# Patient Record
Sex: MALE | Race: WHITE | HISPANIC OR LATINO | Employment: FULL TIME | URBAN - METROPOLITAN AREA
[De-identification: names, ages, dates, MRNs, and addresses within clinical notes are randomized per-mention and may not be internally consistent; named-entity substitution may affect disease eponyms.]

---

## 2017-10-10 ENCOUNTER — TREATMENT (OUTPATIENT)
Dept: PHYSICAL THERAPY | Facility: CLINIC | Age: 42
End: 2017-10-10

## 2017-10-10 ENCOUNTER — TRANSCRIBE ORDERS (OUTPATIENT)
Dept: PHYSICAL THERAPY | Facility: CLINIC | Age: 42
End: 2017-10-10

## 2017-10-10 DIAGNOSIS — S93.402A SPRAIN OF LEFT ANKLE, UNSPECIFIED LIGAMENT, INITIAL ENCOUNTER: Primary | ICD-10-CM

## 2017-10-10 DIAGNOSIS — M25.572 ACUTE LEFT ANKLE PAIN: Primary | ICD-10-CM

## 2017-10-10 DIAGNOSIS — S93.402A SPRAIN OF LEFT ANKLE, UNSPECIFIED LIGAMENT, INITIAL ENCOUNTER: ICD-10-CM

## 2017-10-10 PROCEDURE — 97110 THERAPEUTIC EXERCISES: CPT | Performed by: PHYSICAL THERAPIST

## 2017-10-10 PROCEDURE — 97001 PR PHYS THERAPY EVALUATION: CPT | Performed by: PHYSICAL THERAPIST

## 2017-10-10 PROCEDURE — 97530 THERAPEUTIC ACTIVITIES: CPT | Performed by: PHYSICAL THERAPIST

## 2017-10-10 NOTE — PROGRESS NOTES
"Physical Therapy Initial Evaluation and Plan of Care    TIME IN  TIME   Patient: Eric Rivero   : 1975  Diagnosis/ICD-10 Code:  Acute left ankle pain [M25.572]  Referring practitioner: No ref. provider found    Subjective Evaluation    History of Present Illness  Date of onset: 9/15/2017  Mechanism of injury: Patient's wife is present to assist him in English translation.  He does speak mild amount of English.    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  Went to [facility].  Xray revealed \"small\" broken bone (possibly chip/avulsion fracture) but patient and wife are unsure where.  Given (B) axillary crutches (though patient is using 'hurry cane' today due to rain/fear of falling).  Patient is wearing walking boot.    Referred to ortho/Dr. Ness.  She changed boot and wrote \"sit down duty\" work restrictions.  Work unable to accommodate.    Pain increases after walking approx. 10 minutes.  Still with swelling. Patient denies prior significant (L) ankle sprains; states he has turned it several times but without lingering problems. Reports he cannot move (L) toes 3-5.      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, specifically drywall finishing   Precautions and Work Restrictions: has been off work since injury; sit down duty Quality of life: good    Pain  Current pain ratin (took Tramadol prior)  At best pain ratin (with Tramadol)  At worst pain ratin  Location: (L) ankle medial and lateral malleoli, anterior talocrural joint, Achilles and posterior calcaneus  Quality: dull ache, throbbing, pressure and sharp  Relieving factors: medications, relaxation, rest and ice (Tramadol, Meloxicam)  Aggravating factors: standing, stairs, squatting and ambulation (steps are to basement only)    Social Support  Lives with: spouse and young children    Diagnostic Tests  X-ray: abnormal (per patient and wife, small closed fracture noted " (unsure where))    Treatments  Previous treatment: immobilization  Current treatment: physical therapy  Patient Goals  Patient goals for therapy: decreased pain, decreased edema, increased motion, increased strength, independence with ADLs/IADLs and return to work  Patient goal: get rid of pain, get foot/ankle moving better, back to work and normal activities           Objective       Observations   Left Ankle/Foot   Positive for edema.     Additional Observation Details  Moderate amount of generalized (L) ankle/foot swelling noted    Tenderness   Left Ankle/Foot   Tenderness in the Achilles insertion, deltoid ligament, dorsum foot, fibula and proximal Achilles.     Additional Tenderness Details  Patient moderately TTP (L) metatarsals 2 and 3    Active Range of Motion   Left Ankle/Foot   Plantar flexion: 28 degrees with pain  Inversion: 21 degrees with pain  Eversion: 11 degrees with pain    Right Ankle/Foot   Dorsiflexion (ke): 8 degrees   Plantar flexion: 50 degrees   Inversion: 42 degrees   Eversion: 31 degrees     Additional Active Range of Motion Details  (L) ankle DF (ke) lacking 3 degrees from neutral with pain    Patient only minimally able to flex toes (L) foot, digits 1 and 2 moreso than digits 3, 4, and 5.      Strength/Myotome Testing     Left Ankle/Foot   Dorsiflexion: 4- (within available ROM, with pain)  Plantar flexion: 4 (within available ROM)  Inversion: 3+ (within available ROM, with pain)  Eversion: 4- (within available ROM, with pain)    Right Ankle/Foot   Normal strength    Swelling   Left Ankle/Foot   Figure 8 girth: 55.7 cm.  Malleoli girth: 27.8 cm.    Right Ankle/Foot   Figure 8 girth: 52.6 cm.  Malleoli girth: 25.5 cm.         Assessment & Plan     Assessment  Impairments: abnormal gait, abnormal or restricted ROM, activity intolerance, impaired balance, impaired physical strength, lacks appropriate home exercise program, pain with function and weight-bearing intolerance  Other impairment:  "profoundly limited ADLs, functional mobility, and inabiliy to tolerate working  Assessment details: STGs: to be met in 3 weeks  1. Patient will be independent with initial HEP  2. Patient will be minimally tender to palpation over 50% more localized area  3. Patient will report improved s/s 0-3/10  4. Patient will demonstrate (L) ankle AROM WFL all planes, painfree, including movement of all toes  5. Patient will tolerate weightshifting activity 75% of body weight 10 x 10 sec (L) LE  6. Patient will ambulate short community distances with cane and minimal to moderate antalgia    LTGs: to be met in 6 weeks  1. Patient will be independent with progressed strength and balance HEP  2. Patient will report improved s/s 0-1/10  3. Patient will demonstrate (L) ankle strength grossly 5/5 all planes  4. Patient will demonstrate ability to perform full functional squat  5. Patient will negotiate 12 (7+\") steps reciprocally with handrail  6. Patient will tolerate standing/walking for 2 hour periods without increased s/s > 1/10  7. Patient will demonstrate readiness to RTW without restrictions    Prognosis: good  Functional Limitations: carrying objects, lifting, walking, pushing, uncomfortable because of pain, standing and stooping  Plan  Therapy options: will be seen for skilled physical therapy services  Planned modality interventions: cryotherapy, electrical stimulation/Russian stimulation, contrast bath immersion, iontophoresis, thermotherapy (hydrocollator packs) and ultrasound  Planned therapy interventions: balance/weight-bearing training, flexibility, functional ROM exercises, gait training, home exercise program, joint mobilization, manual therapy, neuromuscular re-education, soft tissue mobilization, strengthening, stretching and therapeutic activities  Other planned therapy interventions: simulated work tasks  Frequency: 3x week  Duration in weeks: 6  Treatment plan discussed with: patient  Plan details: Patient has " follow-up appointment with Dr. Ness on 10/17/17        Manual Therapy:         mins  05442;  Therapeutic Exercise:    14     mins  36868;     Neuromuscular Araceli:        mins  32604;    Therapeutic Activity:     28     mins  37069;     Gait Training:           mins  02217;     Ultrasound:          mins  16257;    Electrical Stimulation:         mins  77010 ( );  Dry Needling          mins self-pay    Timed Treatment:   42   mins   Total Treatment:     77   mins    PT SIGNATURE: Dottie Becerra PT, DPT   DATE TREATMENT INITIATED: 10/10/2017    Initial Certification  Certification Period: 1/8/2018  I certify that the therapy services are furnished while this patient is under my care.  The services outlined above are required by this patient, and will be reviewed every 90 days.     PHYSICIAN:       DATE:     Please sign and return via fax to [phone].. Thank you, [facility].

## 2017-10-13 ENCOUNTER — OFFICE VISIT (OUTPATIENT)
Dept: PHYSICAL THERAPY | Facility: CLINIC | Age: 42
End: 2017-10-13

## 2017-10-13 DIAGNOSIS — M25.572 ACUTE LEFT ANKLE PAIN: Primary | ICD-10-CM

## 2017-10-13 PROCEDURE — 97140 MANUAL THERAPY 1/> REGIONS: CPT | Performed by: PHYSICAL THERAPIST

## 2017-10-13 PROCEDURE — 97110 THERAPEUTIC EXERCISES: CPT | Performed by: PHYSICAL THERAPIST

## 2017-10-13 PROCEDURE — 97530 THERAPEUTIC ACTIVITIES: CPT | Performed by: PHYSICAL THERAPIST

## 2017-10-13 PROCEDURE — 97014 ELECTRIC STIMULATION THERAPY: CPT | Performed by: PHYSICAL THERAPIST

## 2017-10-13 NOTE — PROGRESS NOTES
Physical Therapy Daily Progress Note    Time In 1350  Time Out 1445    Eric Rivero reports: left ankle felt a little better after treatment.  States that left ankle moved a little easier but still has pain with certain movements/positions.    Subjective     Objective       Observations   Left Ankle/Foot   Positive for edema.     Additional Observation Details  Ambulates with noted antalgia left LE with walker boot(decreased step/stride length) and one crutch    Tenderness   Left Ankle/Foot   Tenderness in the anterior talofibular ligament and lateral malleolus.      See Exercise, Manual, and Modality Logs for complete treatment.   Added LE bike x 3 min  Exercise rationale, pain free performance/progression to patient and   Home use of ice and elevation left ankle  Instructed in proper use and ambulation with one crutch and walker boot.    Assessment/Plan  Pt presents with noted antalgic gait(decreased step/stride length) left LE while amb in walker boot.  Subjectively, he reports that exercises are helpful but that he still has pain with certain movements/positions. Objectively, he presents with localized swelling along lateral malleoli and forefoot from inactivity/lack of movement.  Should continue to improve with rehab efforts.    Progress strengthening /stabilization /functional activity as symptoms allow.           Manual Therapy:   8    mins  21136;  Therapeutic Exercise:    15    mins  66852;     Neuromuscular Araceli:        mins  81903;    Therapeutic Activity:   10      mins  00331;     Gait Training:           mins  30665;     Ultrasound:       mins  06976;    Electrical Stimulation:    20     mins  95454 ( );      Timed Treatment:   53  mins   Total Treatment:    55   mins    Adam Cruz, [place]    Physical Therapist Assistant [place] [id number]

## 2017-10-16 ENCOUNTER — TREATMENT (OUTPATIENT)
Dept: PHYSICAL THERAPY | Facility: CLINIC | Age: 42
End: 2017-10-16

## 2017-10-16 DIAGNOSIS — M25.572 ACUTE LEFT ANKLE PAIN: Primary | ICD-10-CM

## 2017-10-16 DIAGNOSIS — S93.402D SPRAIN OF LEFT ANKLE, SUBSEQUENT ENCOUNTER: ICD-10-CM

## 2017-10-16 PROCEDURE — 97014 ELECTRIC STIMULATION THERAPY: CPT | Performed by: PHYSICAL THERAPIST

## 2017-10-16 PROCEDURE — 97530 THERAPEUTIC ACTIVITIES: CPT | Performed by: PHYSICAL THERAPIST

## 2017-10-16 PROCEDURE — 97110 THERAPEUTIC EXERCISES: CPT | Performed by: PHYSICAL THERAPIST

## 2017-10-16 NOTE — PROGRESS NOTES
Physical Therapy Progress Note    Time In 1051  Time Out 1148      10/16/2017  Marysol Ness MD    Re: Eric Rivero  ________________________________________________________________    Mr. Eric Rivero, has attended 3/3 PT sessions.  Treatment has consisted of: patient education, therapeutic activity, therapeutic exercise, and modalities.     S: Mr. Eric Rivero states: Patient indicates his ankle is feeling a little better and moving a little better.  He believes he is walking about the same because he is still in the walking boot and using the hurry cane.     Subjective Evaluation    Pain  Current pain ratin  Location: (L) ankle at medial and lateral malleolus and distal gastroc/Achilles           Objective       Tenderness   Left Ankle/Foot   Tenderness in the lateral malleolus and medial malleolus.     Additional Tenderness Details  Mod (+) TTP at distal gastroc muscle    Active Range of Motion   Left Ankle/Foot   Dorsiflexion (ke): 5 degrees   Plantar flexion: 25 degrees with pain  Inversion: 42 degrees with pain  Eversion: 9 degrees     Strength/Myotome Testing     Left Ankle/Foot   Dorsiflexion: 4+  Plantar flexion: 4+  Inversion: 4  Eversion: 4    Ambulation     Ambulation: Level Surfaces   Ambulation with assistive device: independent    Additional Level Surfaces Ambulation Details  Patient ambulates with (L) walking boot and hurry cane (R) UE with significant compensations.     See Exercise, Manual, and Modality Logs for complete treatment.       Assessment & Plan     Assessment  Assessment details: Patient has been compliant and cooperative with PT efforts.  He reports mildly improved (L) ankle s/s.  He exhibits slightly improved (L) ankle dorsiflexion and inversion AROM as well as strength all planes. He is able to actively flex toes 3-5 whereas he was previously unable. His gait remains compensated due to wearing (L) walking boot and using cane.  He responds positively to modality  application such as electrical stimulation and ice.  He is progressing toward PT goals but due to short duration of PT thus far would benefit to continue skilled PT services at this time.  Please advise as to patient's boot wear and wean schedule, per your recommendations.          Awaiting MD orders (to Dr. Ness tomorrow).         Manual Therapy:         mins  94372;  Therapeutic Exercise:    18     mins  09641;     Neuromuscular Araceli:        mins  12378;    Therapeutic Activity:     24     mins  79708;     Gait Training:           mins  79249;     Ultrasound:          mins  58804;    Electrical Stimulation:    15     mins  43749 ( );  Dry Needling          mins self-pay    Timed Treatment:   42   mins   Total Treatment:     57   mins    Dottie Becerra PT, DPT  Physical Therapist  [place] License # [id number]

## 2017-10-18 ENCOUNTER — TREATMENT (OUTPATIENT)
Dept: PHYSICAL THERAPY | Facility: CLINIC | Age: 42
End: 2017-10-18

## 2017-10-18 DIAGNOSIS — M25.572 ACUTE LEFT ANKLE PAIN: Primary | ICD-10-CM

## 2017-10-18 DIAGNOSIS — S93.402D SPRAIN OF LEFT ANKLE, SUBSEQUENT ENCOUNTER: ICD-10-CM

## 2017-10-18 PROCEDURE — 97014 ELECTRIC STIMULATION THERAPY: CPT | Performed by: PHYSICAL THERAPIST

## 2017-10-18 PROCEDURE — 97110 THERAPEUTIC EXERCISES: CPT | Performed by: PHYSICAL THERAPIST

## 2017-10-18 PROCEDURE — 97530 THERAPEUTIC ACTIVITIES: CPT | Performed by: PHYSICAL THERAPIST

## 2017-10-18 NOTE — PROGRESS NOTES
Physical Therapy Daily Progress Note    Time In 1453  Time Out 1552    Eric Rivero reports: Ankle/foot is getting better.  It is tight in distal gastroc/Achilles complex.  Spoke with Shen Isabel PA-C who stated patient does not have an ankle fracture and that he can begin weaning out of the boot with the hope that at his next follow-up (in 2.5 - 3 wks) he is fully weaned from boot.      Subjective     Objective   See Exercise, Manual, and Modality Logs for complete treatment.   *Added rockerboard, BAPS board, and standing weightshifts  *Increased LE bike to 6 min  *Initiated weightshifting activity in standing    Assessment & Plan     Assessment  Assessment details: Patient arrives to PT wearing (L) walking boot and using cane (R) UE.  He is able to initiate weightbearing activity but cannot fully weightbear (L) LE with UE support due to pain.  He is limited in performance of FW/BW rockerboard activity by gastroc and Achilles tightness which limits dorsiflexion ROM arc.  Discussed with patient the goal to be fully weaned out of boot by next MD follow-up as well as method of achieving this by gradually weaning a little more each day.  Patient and wife verbalize understanding.        Progress strengthening /stabilization /functional activity         Manual Therapy:         mins  12651;  Therapeutic Exercise:    21     mins  11657;     Neuromuscular Araceli:        mins  37463;    Therapeutic Activity:     12     mins  26132;     Gait Training:           mins  13286;     Ultrasound:          mins  94421;    Electrical Stimulation:    15     mins  29126 ( );  Dry Needling          mins self-pay    Timed Treatment:   33   mins   Total Treatment:     59   mins    Dottie Becerra PT, DPT  Physical Therapist  [place] License # [id number]

## 2017-10-20 ENCOUNTER — TREATMENT (OUTPATIENT)
Dept: PHYSICAL THERAPY | Facility: CLINIC | Age: 42
End: 2017-10-20

## 2017-10-20 DIAGNOSIS — M25.572 ACUTE LEFT ANKLE PAIN: Primary | ICD-10-CM

## 2017-10-20 DIAGNOSIS — S93.402D SPRAIN OF LEFT ANKLE, SUBSEQUENT ENCOUNTER: ICD-10-CM

## 2017-10-20 PROCEDURE — 97110 THERAPEUTIC EXERCISES: CPT | Performed by: PHYSICAL THERAPIST

## 2017-10-20 PROCEDURE — 97530 THERAPEUTIC ACTIVITIES: CPT | Performed by: PHYSICAL THERAPIST

## 2017-10-20 PROCEDURE — 97014 ELECTRIC STIMULATION THERAPY: CPT | Performed by: PHYSICAL THERAPIST

## 2017-10-20 NOTE — PROGRESS NOTES
"Physical Therapy Daily Progress Note    Time In 1502  Time Out 1553    Eric Rivero reports: \"My ankle still feels a little tight with some things.\"    Subjective     Objective   See Exercise, Manual, and Modality Logs for complete treatment.   *Added 4\" FW and LAT step-ups  *Initiated 4-way resisted ankle vs red theraband    Assessment & Plan     Assessment  Assessment details: Patient is able to initiate forward and lateral step activity on 4\" step without significantly increased s/s.  He is also able to progress to use of red theraband for resisted ankle 4-way AROM with good AROM arcs and fair eccentric control during performance.  Encouraged patient to progressively and gradually wean from boot as able. Patient and wife verbalize understanding.        Progress strengthening /stabilization /functional activity. Progress gait training without walking boot/with regular shoe.         Manual Therapy:         mins  47780;  Therapeutic Exercise:    22     mins  14799;     Neuromuscular Araceli:        mins  82545;    Therapeutic Activity:     14     mins  58706;     Gait Training:           mins  94365;     Ultrasound:          mins  56402;    Electrical Stimulation:    15     mins  65190 ( );  Dry Needling          mins self-pay    Timed Treatment:   36   mins   Total Treatment:     51   mins    Dottie Becerra PT, DPT  Physical Therapist  [place] License # [id number]    "

## 2017-10-23 ENCOUNTER — TREATMENT (OUTPATIENT)
Dept: PHYSICAL THERAPY | Facility: CLINIC | Age: 42
End: 2017-10-23

## 2017-10-23 DIAGNOSIS — S93.402D SPRAIN OF LEFT ANKLE, SUBSEQUENT ENCOUNTER: ICD-10-CM

## 2017-10-23 DIAGNOSIS — M25.572 ACUTE LEFT ANKLE PAIN: Primary | ICD-10-CM

## 2017-10-23 PROCEDURE — 97530 THERAPEUTIC ACTIVITIES: CPT | Performed by: PHYSICAL THERAPIST

## 2017-10-23 PROCEDURE — 97110 THERAPEUTIC EXERCISES: CPT | Performed by: PHYSICAL THERAPIST

## 2017-10-23 PROCEDURE — 97014 ELECTRIC STIMULATION THERAPY: CPT | Performed by: PHYSICAL THERAPIST

## 2017-10-23 NOTE — PROGRESS NOTES
"Physical Therapy Daily Progress Note    Time In 1327  Time Out 1424    Eric Rivero reports: \"I'm not using the [walking boot] much anymore.  I'm walking pretty good without it.  I have a little pain in [Achiles, distal gastroc, and medial and lateral malleolus].  It's getting better.\"    Subjective     Objective   See Exercise, Manual, and Modality Logs for complete treatment.   *Increased LE bike time  *Initiated ambulation on treadmill with regular shoe    Assessment & Plan     Assessment  Assessment details: Patient arrives to PT this date wearing closed-toed sandals on both feet without boot. He ambulates on level surface and on treadmill with minimal antalgia (L) LE at slow speed with cueing given to facilitate more equal stance time as well as heel to toe gait pattern.  He reports increased s/s (L) distal gastroc/Achilles and medial and lateral malleoli during ambulation.  Encouraged patient to begin taking frequent, short walks at home, inside, and in the community (i.e. In stores) to progress tolerance to ambulation and facilitate improved weightbearing for functional mobility.        Progress strengthening /stabilization /functional activity - progress weightbearing activities and consider addition of SLS.  Reassess (L) ankle AROM and strength.         Manual Therapy:         mins  55630;  Therapeutic Exercise:    25     mins  19677;     Neuromuscular Araceli:        mins  54609;    Therapeutic Activity:     14     mins  77814;     Gait Training:           mins  79066;     Ultrasound:          mins  48997;    Electrical Stimulation:    15     mins  46618 ( );  Dry Needling          mins self-pay    Timed Treatment:   39   mins   Total Treatment:     57   mins    Dottie Becerra PT, DPT  Physical Therapist  [place] License # [id number]    "

## 2017-10-25 ENCOUNTER — TREATMENT (OUTPATIENT)
Dept: PHYSICAL THERAPY | Facility: CLINIC | Age: 42
End: 2017-10-25

## 2017-10-25 DIAGNOSIS — S93.402D SPRAIN OF LEFT ANKLE, SUBSEQUENT ENCOUNTER: Primary | ICD-10-CM

## 2017-10-25 DIAGNOSIS — M25.572 ACUTE LEFT ANKLE PAIN: ICD-10-CM

## 2017-10-25 PROCEDURE — 97110 THERAPEUTIC EXERCISES: CPT | Performed by: PHYSICAL THERAPIST

## 2017-10-25 PROCEDURE — 97530 THERAPEUTIC ACTIVITIES: CPT | Performed by: PHYSICAL THERAPIST

## 2017-10-25 PROCEDURE — 97014 ELECTRIC STIMULATION THERAPY: CPT | Performed by: PHYSICAL THERAPIST

## 2017-10-25 NOTE — PROGRESS NOTES
"Physical Therapy Daily Progress Note    Time In 1325  Time Out 1433    Eric Rivero reports: Patient indicates he has pain on both sides of (L) ankle when he puts full weight onto (L) LE. He also reports he is not wearing the boot at all anymore and did not walk with the cane hardly at all yesterday.  He reports pain with descending steps.    Subjective     Objective       Active Range of Motion   Left Ankle/Foot   Dorsiflexion (ke): 3 degrees with pain  Plantar flexion: 42 degrees with pain  Inversion: 44 degrees with pain  Eversion: 12 degrees with pain    Additional Active Range of Motion Details  Pain with all (L) ankle AROM is located at anterior talocrural joint    Swelling   Left Ankle/Foot   Figure 8 girth: 55.2 cm.     See Exercise, Manual, and Modality Logs for complete treatment.   *Increased LE bike time  *Increased step height to 6\"  *Added soleus stretch    Assessment & Plan     Assessment  Assessment details: Patient demonstrates improved (L) ankle AROM all planes except dorsiflexion, and slightly improved (L) ankle girth (as assessed via figure 8 technique) but it remains significantly more swollen than (R) ankle. He ambulates with moderate to at times significant antalgia and compensation (L) LE despite cueing for more equal weightshift and stance time.  Patient expresses concern at the persistent swelling of (L) ankle.  He reports majority of pain c/o are at (L) anterior ankle, medial and lateral malleoli, and distal calf and Achilles.        Progress strengthening /stabilization /functional activity with emphasis on weightbearing and functional mobility.         Manual Therapy:         mins  02073;  Therapeutic Exercise:    26     mins  11485;     Neuromuscular Araceli:        mins  48740;    Therapeutic Activity:     16     mins  08310;     Gait Training:           mins  28463;     Ultrasound:          mins  01037;    Electrical Stimulation:    15     mins  47625 ( );  Dry Needling        "   mins self-pay    Timed Treatment:   42   mins   Total Treatment:     68   mins    Dottie Becerra PT, DPT  Physical Therapist  [place] License # [id number]

## 2017-10-27 ENCOUNTER — OFFICE VISIT (OUTPATIENT)
Dept: PHYSICAL THERAPY | Facility: CLINIC | Age: 42
End: 2017-10-27

## 2017-10-27 DIAGNOSIS — M25.572 ACUTE LEFT ANKLE PAIN: ICD-10-CM

## 2017-10-27 DIAGNOSIS — S93.402D SPRAIN OF LEFT ANKLE, SUBSEQUENT ENCOUNTER: Primary | ICD-10-CM

## 2017-10-27 PROCEDURE — 97110 THERAPEUTIC EXERCISES: CPT | Performed by: PHYSICAL THERAPIST

## 2017-10-27 PROCEDURE — 97014 ELECTRIC STIMULATION THERAPY: CPT | Performed by: PHYSICAL THERAPIST

## 2017-10-27 PROCEDURE — 97530 THERAPEUTIC ACTIVITIES: CPT | Performed by: PHYSICAL THERAPIST

## 2017-10-27 NOTE — PROGRESS NOTES
"Physical Therapy Daily Progress Note    Time In 1330  Time Out 1500    Eric Rivero reports: still pain in left ankle \"across the bones\".  States that he still has difficulty wearing tennis shoes/work boot due to swelling and the tightness of the boot.  Notes discomfort with full weight bearing through right foot.    Subjective     Objective       Ambulation     Observational Gait   Decreased left stance time and left step length.     Additional Observational Gait Details  Presents wearing low cut dress shoe vs tennis shoe secondary to reported tightness.     See Exercise, Manual, and Modality Logs for complete treatment.   Increased reps with HEP regimen.        Assessment/Plan  Demonstrates improved exercise capability in regards to HEP by being able to increase reps without increased noted/reported symptoms/swelling.  Mild edema present left lateral malleolus.  Ambulates with decreased step and stride length and decreased unilateral left LE stance.    Progress strengthening /stabilization /functional activity to include standing Baps and Rockerboard activities.           Manual Therapy:        mins  64025;  Therapeutic Exercise:   35      mins  96721;     Neuromuscular Araceli:        mins  21331;    Therapeutic Activity:    18    mins  17069;     Gait Training:           mins  59191;     Ultrasound:         mins  70558;    Electrical Stimulation:    20    mins  63282 ( );      Timed Treatment:   73  mins   Total Treatment:     90   mins    Adam Cruz PTA    Physical Therapist Assistant [place] [address]    "

## 2017-10-30 ENCOUNTER — OFFICE VISIT (OUTPATIENT)
Dept: PHYSICAL THERAPY | Facility: CLINIC | Age: 42
End: 2017-10-30

## 2017-10-30 DIAGNOSIS — S93.402D SPRAIN OF LEFT ANKLE, SUBSEQUENT ENCOUNTER: Primary | ICD-10-CM

## 2017-10-30 DIAGNOSIS — M25.572 ACUTE LEFT ANKLE PAIN: ICD-10-CM

## 2017-10-30 PROCEDURE — 97530 THERAPEUTIC ACTIVITIES: CPT | Performed by: PHYSICAL THERAPIST

## 2017-10-30 PROCEDURE — 97110 THERAPEUTIC EXERCISES: CPT | Performed by: PHYSICAL THERAPIST

## 2017-10-30 PROCEDURE — 97014 ELECTRIC STIMULATION THERAPY: CPT | Performed by: PHYSICAL THERAPIST

## 2017-10-30 NOTE — PROGRESS NOTES
"Physical Therapy Daily Progress Note    Time In 1050  Time Out 1215    Eric Rivero reports: left ankle  along \"my bones\"(malleoli).  States that his left ankle still hurts him to put all weight on it(standing on one leg) or with certain movements or positions(shifting weight to outside of foot0.    Subjective     Objective       Tenderness   Left Ankle/Foot   Tenderness in the anterior talofibular ligament and calcaneofibular ligament.     Additional Tenderness Details  With moderate/deep pressure    Swelling     Right Ankle/Foot   Figure 8: 55.2 (cm; 55.2 cm) cm  Malleoli: 27 (cm) cm    Ambulation     Observational Gait   Gait: antalgic   Decreased walking speed, stride length and left step length.     Additional Observational Gait Details  Noted ambulates with mild antalgia with left foot ER and decreased step/stride length but in clinic on treadmill and when exiting clinic walking to car.     See Exercise, Manual, and Modality Logs for complete treatment.   Increased resistance with LE bike to Lv4  Changed Baps Lv2 to standing vs. Sitting and seated rockerboard to standing.      Assessment/Plan  Compliant/cooperative with rehab efforts to left ankle.  Subjective complaints persists of lateral malleoli discomfort with unilateral stance and lateral weight shift activities.  Objectively, he presents with noted left lower extremity antalgia with treadmill walking and ambulating to car. Tenderness exhibited along calcaneofibular ligament as well as ATFL left ankle.  Able to progress therapeutic exercises to include more weightbearing without increased symptoms only mild increase in discomfort.  Should continue to improve with rehab efforts and activity progression.    Progress strengthening /stabilization /functional activity as symptoms allow.  Discussed with patient to bring/wear tennis shoes next session to perform exercise/activity.           Manual Therapy:         mins  58380;  Therapeutic " Exercise:   35     mins  80245;     Neuromuscular Araceli:       mins  24712;    Therapeutic Activity:    15      mins  27064;     Gait Training:           mins  55523;     Ultrasound:          mins  80208;    Electrical Stimulation:    20     mins  96595 ( );      Timed Treatment:  70  mins   Total Treatment:     85   mins    Adam Cruz PTA    Physical Therapist Assistant [place] [address]

## 2017-11-01 ENCOUNTER — TREATMENT (OUTPATIENT)
Dept: PHYSICAL THERAPY | Facility: CLINIC | Age: 42
End: 2017-11-01

## 2017-11-01 DIAGNOSIS — S93.402D SPRAIN OF LEFT ANKLE, SUBSEQUENT ENCOUNTER: Primary | ICD-10-CM

## 2017-11-01 DIAGNOSIS — M25.572 ACUTE LEFT ANKLE PAIN: ICD-10-CM

## 2017-11-01 PROCEDURE — 97530 THERAPEUTIC ACTIVITIES: CPT | Performed by: PHYSICAL THERAPIST

## 2017-11-01 PROCEDURE — 97110 THERAPEUTIC EXERCISES: CPT | Performed by: PHYSICAL THERAPIST

## 2017-11-01 PROCEDURE — 97014 ELECTRIC STIMULATION THERAPY: CPT | Performed by: PHYSICAL THERAPIST

## 2017-11-01 NOTE — PROGRESS NOTES
Physical Therapy Daily Progress Note    Time In 1352  Time Out 1510    Eric Rivero reports: Walking a little better, still have pain in the [talocrural joint] when putting full weight on (L) LE and bending forward at the ankle [dorsiflexion with weightbearing].  Don't know how my ankle will do with all the standing, walking, and climbing at work for 10-12 hours.    Subjective     Objective   See Exercise, Manual, and Modality Logs for complete treatment.   *Progressed 4-way resisted ankle to green band  *Added mini squats    Assessment & Plan     Assessment  Assessment details: Patient is able to initiate partial squat without significantly increased s/s but requires mod/max verbal cueing of PT as well as demo for proper technique with fair return.  Increased apparent effort and mildly decreased fluidity/control is noted with progression to green band resisted ankle 4-way with a slightly sooner fatigue point.  In general, patient's c/o pain surrounding (L) talocrural joint and Achilles persists with most weightbearing activities.  Poor static single limb balance persists due to slow timing of muscle activation and coordination surrounding the (L) ankle.  Discussed with patient's wife the anticipated benefit of further strengthening of the left LE prior to RTW. She verbalizes understanding and relays this to patient as well.        Progress strengthening /stabilization /functional activity         Manual Therapy:         mins  43948;  Therapeutic Exercise:    19     mins  92176;     Neuromuscular Araceli:        mins  46863;    Therapeutic Activity:     15     mins  50183;     Gait Training:           mins  20765;     Ultrasound:          mins  04662;    Electrical Stimulation:    15     mins  96810 ( );  Dry Needling          mins self-pay    Timed Treatment:   34   mins   Total Treatment:     78   mins    Dottie Becerra PT, DPT  Physical Therapist  [place] License # [id number]

## 2017-11-06 ENCOUNTER — TREATMENT (OUTPATIENT)
Dept: PHYSICAL THERAPY | Facility: CLINIC | Age: 42
End: 2017-11-06

## 2017-11-06 DIAGNOSIS — M25.572 ACUTE LEFT ANKLE PAIN: ICD-10-CM

## 2017-11-06 DIAGNOSIS — S93.402D SPRAIN OF LEFT ANKLE, SUBSEQUENT ENCOUNTER: Primary | ICD-10-CM

## 2017-11-06 PROCEDURE — 97014 ELECTRIC STIMULATION THERAPY: CPT | Performed by: PHYSICAL THERAPIST

## 2017-11-06 PROCEDURE — 97530 THERAPEUTIC ACTIVITIES: CPT | Performed by: PHYSICAL THERAPIST

## 2017-11-06 PROCEDURE — 97110 THERAPEUTIC EXERCISES: CPT | Performed by: PHYSICAL THERAPIST

## 2017-11-06 NOTE — PROGRESS NOTES
"Physical Therapy Progress Note    Time In 1301  Time Out 1409      11/6/2017  Marysol Ness MD    Re: Eric Rivero  ________________________________________________________________    Mr. Eric Rivero, has attended 11/11 PT sessions.  Treatment has consisted of: patient education, therapeutic exercise, therapeutic activity, and modalities.     S: Mr. Eric Rivero states: \"My ankle is about the same as last week. I am able to move it better and walk better but I still have pain. Yesterday, I tried to do some walking at the park and after about 20 mins I had pain in my ankle [patient indicates (L) ankle at lateral malleolus, anterior talocrural joint, Achilles, and medial malleolus].\" Patient again indicates that he is walking a little better but still has pain in the [talocrural joint] when putting full weight on (L) LE and bending forward at the ankle [dorsiflexion with weightbearing].     Subjective Evaluation    Pain  Pain scale: 5-6/10 with walking.  Location: surrounding (L) talocrural joint           Objective       Tenderness     Additional Tenderness Details  Min/mod (+) TTP (L) anterior lateral malleolus and lateral Achilles    Active Range of Motion   Left Ankle/Foot   Dorsiflexion (ke): 2 degrees with pain  Plantar flexion: 45 degrees with pain  Inversion: 36 degrees with pain  Eversion: 15 degrees with pain    Strength/Myotome Testing     Left Ankle/Foot   Left ankle dorsiflexion strength: 5-/5.  Plantar flexion: 4+  Inversion: 4+  Eversion: 4+    Swelling   Left Ankle/Foot   Figure 8 girth: 56.2 cm.  Malleoli: 27 cm     See Exercise, Manual, and Modality Logs for complete treatment.       Assessment & Plan     Assessment  Assessment details: Patient has been compliant and cooperative with PT interventions.  He has now fully weaned from the walking boot and demonstrates steady improvements in gait mechanics with short distance community ambulation.  Deficits persist re: (L) ankle " dorsiflexion AROM, swelling, and functional mobility to include descending steps.  He is concerned about having to stand, walk, climb, etc for 8-10 hour periods at work and would like to continue physical therapy.  He may benefit from additional strengthening to progress functional activity tolerance.  Please advise after your exam.        Awaiting MD orders (patient has appointment on Tuesday, 11/7/17).         Manual Therapy:         mins  50988;  Therapeutic Exercise:    19     mins  82890;     Neuromuscular Araceli:        mins  90276;    Therapeutic Activity:     28     mins  81437;     Gait Training:           mins  91869;     Ultrasound:          mins  48365;    Electrical Stimulation:    15     mins  59911 ( );  Dry Needling          mins self-pay    Timed Treatment:   47   mins   Total Treatment:     68   mins    Dottie Becerra PT, DPT  Physical Therapist  [place] License # [id number]

## 2017-11-13 ENCOUNTER — TREATMENT (OUTPATIENT)
Dept: PHYSICAL THERAPY | Facility: CLINIC | Age: 42
End: 2017-11-13

## 2017-11-13 DIAGNOSIS — M25.572 ACUTE LEFT ANKLE PAIN: ICD-10-CM

## 2017-11-13 DIAGNOSIS — S93.402D SPRAIN OF LEFT ANKLE, SUBSEQUENT ENCOUNTER: Primary | ICD-10-CM

## 2017-11-13 PROCEDURE — 97530 THERAPEUTIC ACTIVITIES: CPT | Performed by: PHYSICAL THERAPIST

## 2017-11-13 PROCEDURE — 97110 THERAPEUTIC EXERCISES: CPT | Performed by: PHYSICAL THERAPIST

## 2017-11-16 ENCOUNTER — TREATMENT (OUTPATIENT)
Dept: PHYSICAL THERAPY | Facility: CLINIC | Age: 42
End: 2017-11-16

## 2017-11-16 DIAGNOSIS — S93.402D SPRAIN OF LEFT ANKLE, SUBSEQUENT ENCOUNTER: Primary | ICD-10-CM

## 2017-11-16 DIAGNOSIS — M25.572 ACUTE LEFT ANKLE PAIN: ICD-10-CM

## 2017-11-16 PROCEDURE — 97530 THERAPEUTIC ACTIVITIES: CPT | Performed by: PHYSICAL THERAPIST

## 2017-11-16 PROCEDURE — 97014 ELECTRIC STIMULATION THERAPY: CPT | Performed by: PHYSICAL THERAPIST

## 2017-11-16 PROCEDURE — 97110 THERAPEUTIC EXERCISES: CPT | Performed by: PHYSICAL THERAPIST

## 2017-11-16 NOTE — PROGRESS NOTES
"Physical Therapy Daily Progress Note    Time In 1431  Time Out 1528    Eric Rivero reports: \"My [ankle] hurts on the top still.  Today it hurt after 9 minutes on bike.\"    Subjective     Objective   See Exercise, Manual, and Modality Logs for complete treatment.       Assessment & Plan     Assessment  Assessment details: Patient reports increased ankle/foot s/s this date which even affect him during performance of LE Bike and then moreso with weightbearing activities.    He requires moderate verbal cueing for correct technique prn during PT session.  Single limb stance activities persist as significantly difficult. He is hesitant with progression of weightbearing functional activities due to apparent anticipation of pain.         Progress strengthening /stabilization /functional activity         Manual Therapy:         mins  28892;  Therapeutic Exercise:    11     mins  68998;     Neuromuscular Araceli:        mins  88670;    Therapeutic Activity:     13     mins  06555;     Gait Training:           mins  07914;     Ultrasound:          mins  96953;    Electrical Stimulation:    15     mins  34367 ( );  Dry Needling          mins self-pay    Timed Treatment:   24   mins   Total Treatment:     57   mins    Dottie Becerra PT, DPT  Physical Therapist  [place] License # [id number]    "

## 2017-11-21 ENCOUNTER — TREATMENT (OUTPATIENT)
Dept: PHYSICAL THERAPY | Facility: CLINIC | Age: 42
End: 2017-11-21

## 2017-11-21 DIAGNOSIS — S93.402D SPRAIN OF LEFT ANKLE, SUBSEQUENT ENCOUNTER: Primary | ICD-10-CM

## 2017-11-21 DIAGNOSIS — M25.572 ACUTE LEFT ANKLE PAIN: ICD-10-CM

## 2017-11-21 PROCEDURE — 97530 THERAPEUTIC ACTIVITIES: CPT | Performed by: PHYSICAL THERAPIST

## 2017-11-21 PROCEDURE — 97110 THERAPEUTIC EXERCISES: CPT | Performed by: PHYSICAL THERAPIST

## 2017-11-21 PROCEDURE — 97014 ELECTRIC STIMULATION THERAPY: CPT | Performed by: PHYSICAL THERAPIST

## 2017-11-21 NOTE — PROGRESS NOTES
"Physical Therapy Daily Progress Note    Time In 1331  Time Out 1438    Eric Rivero reports: No new c/o; patient again c/o pain along periphery of (L) talocrural joint.    Subjective     Objective   See Exercise, Manual, and Modality Logs for complete treatment.   *Patient is able to progress to 6\" step for forward stepovers    Assessment & Plan     Assessment  Assessment details: Patient demonstrates persistent deficits in gait mechanics (L) LE as well as ROM, strength, and balance.  This limits his overall functional mobility and standing/walking tolerance.  He appears somewhat hesitant with exercise and activity progressions but is compliant with all treatment.          Progress strengthening /stabilization /functional activity. Initiate simulated work tasks         Manual Therapy:         mins  56552;  Therapeutic Exercise:    11     mins  49096;     Neuromuscular Araceli:        mins  16616;    Therapeutic Activity:     20     mins  84746;     Gait Training:           mins  67305;     Ultrasound:          mins  45835;    Electrical Stimulation:    15     mins  50837 ( );  Dry Needling          mins self-pay    Timed Treatment:   31   mins   Total Treatment:     67   mins    Dottie Becerra PT, DPT  Physical Therapist  [place] License # [id number]    "

## 2017-11-22 ENCOUNTER — OFFICE VISIT (OUTPATIENT)
Dept: PHYSICAL THERAPY | Facility: CLINIC | Age: 42
End: 2017-11-22

## 2017-11-22 DIAGNOSIS — M25.572 ACUTE LEFT ANKLE PAIN: ICD-10-CM

## 2017-11-22 DIAGNOSIS — S93.402D SPRAIN OF LEFT ANKLE, SUBSEQUENT ENCOUNTER: Primary | ICD-10-CM

## 2017-11-22 PROCEDURE — 97110 THERAPEUTIC EXERCISES: CPT | Performed by: PHYSICAL THERAPIST

## 2017-11-22 PROCEDURE — 97014 ELECTRIC STIMULATION THERAPY: CPT | Performed by: PHYSICAL THERAPIST

## 2017-11-22 PROCEDURE — 97530 THERAPEUTIC ACTIVITIES: CPT | Performed by: PHYSICAL THERAPIST

## 2017-11-22 NOTE — PROGRESS NOTES
Physical Therapy Daily Progress Note    Time In 1330  Time Out 1445    Eric Rivero reports: still experiencing left foot/ankle pain when walking.  Does not feel he can do his normal job due to pain and physicality/unpredictability of job(climbing, uneven ground walking, etc)    Subjective     Objective       Observations     Additional Observation Details  Presents wearing lace up/strap ankle support in/out of clinic.  Performs exercise/activity in clinic without left ankle support.  Ambulates with decreased noted antalgia L LE with in clinic activities though subjectively patient reports pain along top of ankle.    Tenderness     Right Ankle/Foot   Tenderness in the anterior talofibular ligament, calcaneofibular ligament and posterior talofibular ligament.      See Exercise, Manual, and Modality Logs for complete treatment.   Increased rocker board time to 2 minutes each direction  Changed left soleus stretch to standing   Increased standing heel/toe raises to 30    Added; unilateral LLE Baps Board Lv 1 x 10 reps cw/ccw, retro treadmill 2 min 30 secs; balance beam walking A)Heel/toe one board x 5 reps B) Bilateral LE amb on two boards x 5 reps; ladder climbs up/down 2 rungs x 10 reps      Assessment/Plan  Complaint/cooperative with rehab efforts.  Subjective complaints of left foot pain persist with ambulation activities though objectively he is noted to present with improved gait and exercise capability versus initially.  Able to progress exercise tasks to include higher level balance and proprioceptive activities as well as limited job simulated tasks(ladder climb).      Progress strengthening /stabilization /functional activity towards all goals.           Manual Therapy:        mins  35959;  Therapeutic Exercise:    28    mins  56656;     Neuromuscular Araceli:       mins  66428;    Therapeutic Activity:    20     mins  81717;     Gait Training:           mins  87550;     Ultrasound:         mins  41132;     Electrical Stimulation:    15     mins  72644 ( );      Timed Treatment:   63   mins   Total Treatment:     75   mins    Adam Cruz PTA    Physical Therapist Assistant [place] [id number]

## 2017-11-27 ENCOUNTER — TREATMENT (OUTPATIENT)
Dept: PHYSICAL THERAPY | Facility: CLINIC | Age: 42
End: 2017-11-27

## 2017-11-27 DIAGNOSIS — S93.402D SPRAIN OF LEFT ANKLE, SUBSEQUENT ENCOUNTER: Primary | ICD-10-CM

## 2017-11-27 DIAGNOSIS — M25.572 ACUTE LEFT ANKLE PAIN: ICD-10-CM

## 2017-11-27 PROCEDURE — 97014 ELECTRIC STIMULATION THERAPY: CPT | Performed by: PHYSICAL THERAPIST

## 2017-11-27 PROCEDURE — 97110 THERAPEUTIC EXERCISES: CPT | Performed by: PHYSICAL THERAPIST

## 2017-11-27 PROCEDURE — 97530 THERAPEUTIC ACTIVITIES: CPT | Performed by: PHYSICAL THERAPIST

## 2017-11-27 NOTE — PROGRESS NOTES
"Physical Therapy Progress Note    Time In 1255  Time Out 1403      11/27/2017  Marysol Ness MD    Re: Eric Rivero  ________________________________________________________________    Mr. Eric Rivero, has attended 16/16 PT sessions.  Treatment has consisted of: patient education, therapeutic activity, therapeutic exercise, and modalities.     S: Mr. Eric Rivero states: \"My ankle still hurts.  If I am not doing anything it is not too bad but when I walk or try to do steps it hurts my ankle and my heel.  I also have pain when I try to kneel on my left leg. I cannot squat all the way down because it hurts too much.  It is better than it was 3 weeks ago though.\"    Subjective Evaluation    Pain  Pain scale at highest: 7-8/10 with provoking activities.           Objective       Tenderness   Left Ankle/Foot   Tenderness in the Achilles insertion, anterior ankle, anterior talofibular ligament, lateral malleolus, medial calcaneus and proximal Achilles.     Additional Tenderness Details  Generalized mod (+) TTP (L) ankle anterior, lateral, and posterior    Active Range of Motion   Left Ankle/Foot   Dorsiflexion (ke): 9 degrees   Plantar flexion: 43 degrees with pain  Inversion: 41 degrees   Eversion: 12 degrees     Strength/Myotome Testing     Left Ankle/Foot   Dorsiflexion: 5  Left ankle plantar flexion strength: 5-/5.  Inversion: 4+  Eversion: 4+    Swelling   Left Ankle/Foot   Figure 8 girth: 55.9.  Malleoli: 27 cm    Ambulation     Observational Gait   Gait: antalgic and asymmetric   Decreased left stance time and right step length.     Additional Observational Gait Details  Patient ambulates with mild antalgia (L) LE and notably decreased push-off/active plantarflexion while walking.    Functional Assessment   Squat   Pain and sitting toward right side.     Single Leg Stance   Left: 5 seconds     See Exercise, Manual, and Modality Logs for complete treatment.       Assessment & Plan "     Assessment  Assessment details: Patient has been compliant and cooperative with PT efforts.  He has progressed gradually to initiation of simulated work tasks to include ladder climbs and simulated scaffolding walks. During 3 rung ladder ascension patient pushes off from (L) midfoot rather than ball of foot, though he reports greater pain descending ladder than ascending. Moderate static and dynamic balance deficits are evident during simulated scaffolding walks (long boards on floor).  Moderate lack of active plantarflexion is noted during advancement of (L) LE during ambulation.  He seems to be experiencing greater limiting pain than would be anticipated for an injury of this nature 10+ wks since initial injury, but the communication barrier does make interpreting degree and comparison slightly more challenging. Please advise after your exam.        Awaiting MD orders         Manual Therapy:         mins  67026;  Therapeutic Exercise:    16     mins  08358;     Neuromuscular Araceli:        mins  56814;    Therapeutic Activity:     28     mins  85386;     Gait Training:           mins  94583;     Ultrasound:          mins  35360;    Electrical Stimulation:    15     mins  34837 ( );  Dry Needling          mins self-pay    Timed Treatment:   44   mins   Total Treatment:     68   mins    Dottie Becerra PT, DPT  Physical Therapist  [place] License # [id number]

## 2017-11-30 ENCOUNTER — TREATMENT (OUTPATIENT)
Dept: PHYSICAL THERAPY | Facility: CLINIC | Age: 42
End: 2017-11-30

## 2017-11-30 DIAGNOSIS — M25.572 ACUTE LEFT ANKLE PAIN: ICD-10-CM

## 2017-11-30 DIAGNOSIS — S93.402D SPRAIN OF LEFT ANKLE, SUBSEQUENT ENCOUNTER: Primary | ICD-10-CM

## 2017-11-30 PROCEDURE — 97530 THERAPEUTIC ACTIVITIES: CPT | Performed by: PHYSICAL THERAPIST

## 2017-11-30 PROCEDURE — 97110 THERAPEUTIC EXERCISES: CPT | Performed by: PHYSICAL THERAPIST

## 2017-11-30 PROCEDURE — 97014 ELECTRIC STIMULATION THERAPY: CPT | Performed by: PHYSICAL THERAPIST

## 2017-12-04 ENCOUNTER — TREATMENT (OUTPATIENT)
Dept: PHYSICAL THERAPY | Facility: CLINIC | Age: 42
End: 2017-12-04

## 2017-12-04 DIAGNOSIS — M25.572 ACUTE LEFT ANKLE PAIN: ICD-10-CM

## 2017-12-04 DIAGNOSIS — S93.402D SPRAIN OF LEFT ANKLE, SUBSEQUENT ENCOUNTER: Primary | ICD-10-CM

## 2017-12-04 PROCEDURE — 97545 WORK HARDENING: CPT | Performed by: PHYSICAL THERAPIST

## 2017-12-04 NOTE — PROGRESS NOTES
Patient brings in PT order which specifies initiation of work conditioning activities this date.     See this work conditioning encounter scanned into patients Epic file for exercise/fumctional activity performed 12/4/2017.    PT Signature: Dottie Becerra PT  [place] License #[id number]

## 2017-12-06 ENCOUNTER — TREATMENT (OUTPATIENT)
Dept: PHYSICAL THERAPY | Facility: CLINIC | Age: 42
End: 2017-12-06

## 2017-12-06 DIAGNOSIS — M25.572 ACUTE LEFT ANKLE PAIN: ICD-10-CM

## 2017-12-06 DIAGNOSIS — S93.402D SPRAIN OF LEFT ANKLE, SUBSEQUENT ENCOUNTER: Primary | ICD-10-CM

## 2017-12-06 PROCEDURE — 97545 WORK HARDENING: CPT | Performed by: PHYSICAL THERAPIST

## 2017-12-07 NOTE — PROGRESS NOTES
See this work conditioning encounter scanned into patients Epic file for exercise/fumctional activity performed 12/7/2017 [ERROR: 12/06/17 cbk].    PT Signature: Dottie Becerra PT  [place] License #[id number]

## 2017-12-08 ENCOUNTER — OFFICE VISIT (OUTPATIENT)
Dept: PHYSICAL THERAPY | Facility: CLINIC | Age: 42
End: 2017-12-08

## 2017-12-08 DIAGNOSIS — M25.572 ACUTE LEFT ANKLE PAIN: ICD-10-CM

## 2017-12-08 DIAGNOSIS — S93.402D SPRAIN OF LEFT ANKLE, SUBSEQUENT ENCOUNTER: Primary | ICD-10-CM

## 2017-12-08 PROCEDURE — 97545 WORK HARDENING: CPT | Performed by: PHYSICAL THERAPIST

## 2017-12-08 NOTE — PROGRESS NOTES
See this work conditioning encounter scanned into patients Epic file for exercise/fumctional activity performed 12/8/2017.    PT/PTA Signature: Adam Cruz, PTA  Physical Therapist Assistant [place] [id number]

## 2017-12-11 ENCOUNTER — OFFICE VISIT (OUTPATIENT)
Dept: PHYSICAL THERAPY | Facility: CLINIC | Age: 42
End: 2017-12-11

## 2017-12-11 DIAGNOSIS — M25.572 ACUTE LEFT ANKLE PAIN: ICD-10-CM

## 2017-12-11 DIAGNOSIS — S93.402D SPRAIN OF LEFT ANKLE, SUBSEQUENT ENCOUNTER: Primary | ICD-10-CM

## 2017-12-11 PROCEDURE — 97530 THERAPEUTIC ACTIVITIES: CPT | Performed by: PHYSICAL THERAPIST

## 2017-12-11 PROCEDURE — 97545 WORK HARDENING: CPT | Performed by: PHYSICAL THERAPIST

## 2017-12-11 NOTE — PROGRESS NOTES
"Physical Therapy Progress Note    Time In 1400  Time Out 1550      2017  Marysol Ness MD    Re: Eric Rivero  ________________________________________________________________     Eric Rivero, has attended 6/6 work conditioning sessions 21 times overall since initial evaluation on 10/10/17.  Treatment has consisted of: work conditioning/work simulation activities including ladder climbs, single and double board/beam walking, lift trials with partial squats 10-35 lbs.    S:  Eric Rivero states: left ankle feels \"a little bit better\" since starting work conditioning.  Reports that his ankle feels less swollen and that he is walking \"a little more better\" but that he continues to have pain along the inside/outside of ankle(medial/lateral malleoi) and along anterior ankle which increases with prolonged walking and weightbearing activities.    Subjective Evaluation    Pain  Current pain ratin  At best pain ratin  At worst pain ratin  Quality: sharp, dull ache and throbbing  Relieving factors: rest and ice  Aggravating factors: prolonged positioning           Objective       Observations     Additional Observation Details  Presents without observed edema/effusion left ankle.    Ambulates with improved heel/toe gait with treadmill ambulation but does exhibit noted mild altered gait with ladder climb step up/down activities    Tenderness   Left Ankle/Foot   Tenderness in the anterior talofibular ligament, lateral malleolus, medial malleolus and metatarsal head.     Additional Tenderness Details  Deep palpation ATFL elicits mild tenderness response.  No discernable tenderness malleoli but reported pain   Discomfort reported 3rd metatarsal head with mobilization       Active Range of Motion   Left Ankle/Foot   Dorsiflexion (ke): 10 degrees   Plantar flexion: 47 degrees   Inversion: 40 degrees with pain  Eversion: 15 degrees with pain    Additional Active Range of Motion Details  Pain " reported at end range Inv/EV planes     Strength/Myotome Testing     Left Ankle/Foot   Dorsiflexion: 5  Plantar flexion: 5  Inversion: 4+ (c/o pain with resisitve testing)  Eversion: 4+ (c/o pain with resistive testing)    Tests   Left Ankle/Foot   Negative for anterior drawer, calcaneal squeeze, posterior drawer, valgus tilt and varus tilt.     Swelling   Left Ankle/Foot   Metatarsal heads: 23.9 (cm) cm  Figure 8: 52.7 (cm) cm  Malleoli: 25.5 (cm) cm    Right Ankle/Foot   Figure 8: 52.6 (cm) cm  Malleoli: 25.5 (cm) cm     See Exercise, Manual, and Modality Logs for complete treatment.       Assessment/Plan Compliant and cooperative with PT/work conditioning efforts to date.  Subjectively,he reports that his walking is a little better and that the swelling is better in his left ankle, but that he continues to have pain (up to 8/10) in and around his left ankle with prolonged walking, activities(squatting, balance walking) and certain movements(end range Inv/Ev).  Objectively, he presents with essentially full left ankle mobility and normal  mm strength with deficits in Inv/Ev seemingly associated with subjective pain complaints versus mm weakness.  Girth measurements have greatly improved and are equal to his right ankle.  He is independent with HEP for affected musculature and has been able to progress simulated job activities but limited by subjective pain complaints and reported increased symptoms along anterior ankle and bilateral malleoli.   He seems to be experiencing greater limiting pain than would be expected at this point in his phase of recovery.  Currently does not demonstrate full, safe capability for work at this time given the varied nature and demands of his job duties. Please advise after your exam if continued PT or other medical management options are indicated.    Other  To MD with letter.  Await further orders regarding continued PT intervention.           Manual Therapy:         mins   90827;  Therapeutic Exercise:        mins  59719;     Neuromuscular Araceli:       mins  79707;    Therapeutic Activity:     30     mins  42947;     Gait Training:         mins  94328;     Ultrasound:         mins  09857;    Electrical Stimulation:         mins  03557 ( );      Timed Treatment:     mins   Total Treatment:    110   mins    Adam Cruz PTA  Physical Therapist Assistant # [id number]